# Patient Record
Sex: MALE | Race: WHITE
[De-identification: names, ages, dates, MRNs, and addresses within clinical notes are randomized per-mention and may not be internally consistent; named-entity substitution may affect disease eponyms.]

---

## 2017-02-27 ENCOUNTER — HOSPITAL ENCOUNTER (OUTPATIENT)
Dept: HOSPITAL 62 - SP | Age: 72
End: 2017-02-27
Attending: FAMILY MEDICINE
Payer: MEDICARE

## 2017-02-27 DIAGNOSIS — I63.239: Primary | ICD-10-CM

## 2017-02-27 PROCEDURE — 93880 EXTRACRANIAL BILAT STUDY: CPT

## 2018-10-05 ENCOUNTER — HOSPITAL ENCOUNTER (OUTPATIENT)
Dept: HOSPITAL 62 - RAD | Age: 73
End: 2018-10-05
Attending: FAMILY MEDICINE
Payer: MEDICARE

## 2018-10-05 DIAGNOSIS — R05: Primary | ICD-10-CM

## 2018-10-05 PROCEDURE — 71250 CT THORAX DX C-: CPT

## 2018-10-05 NOTE — RADIOLOGY REPORT (SQ)
EXAM DESCRIPTION:  CT CHEST WITHOUT



COMPLETED DATE/TIME:  10/5/2018 9:07 am



REASON FOR STUDY:  COUGH R05  COUGH



COMPARISON:  None.



TECHNIQUE:  CT scan performed of the chest without intravenous contrast.  Images reviewed with lung, 
soft tissue and bone windows.  Reconstructed coronal and sagittal MPR images reviewed.  All images st
ored on PACS.

All CT scanners at this facility use dose modulation, iterative reconstruction, and/or weight based d
osing when appropriate to reduce radiation dose to as low as reasonably achievable (ALARA).

[facility]: Dose Right  [facility]: CareDose    [facility]: Dose Right    [facility]: Teradose 4D    [facility]: Smart Technologies



RADIATION DOSE:  CT Rad equipment meets quality standard of care and radiation dose reduction techniq
ues were employed. CTDIvol: 3.6 mGy. DLP: 152 mGy-cm. mGy.



LIMITATIONS:  No technical limitations.



FINDINGS:  LUNGS AND PLEURA: No masses, infiltrates, or pneumothorax.  No pleural effusions or pleura
l calcifications.

HILAR AND MEDIASTINAL STRUCTURES: No identified masses or abnormal nodes.  No obvious aneurysm.

HEART AND VASCULAR STRUCTURES: No aneurysm.  No pericardial effusion.

UPPER ABDOMEN: No significant findings.  Limited exam.

THYROID AND OTHER SOFT TISSUES: No masses.  No adenopathy.

BONES: No significant finding.

HARDWARE: None in the chest.

OTHER: No other significant findings.



IMPRESSION:  NO SIGNIFICANT FINDING ON NON-CONTRASTED CHEST CT.



TECHNICAL DOCUMENTATION:  JOB ID:  [id number]

Quality ID # [id number]: Final reports with documentation of one or more dose reduction techniques (e.g., Au
tomated exposure control, adjustment of the mA and/or kV according to patient size, use of iterative 
reconstruction technique)

 2011 [email or web address]- All Rights Reserved



Reading location - IP/workstation name: [email or web address]

## 2018-12-28 ENCOUNTER — HOSPITAL ENCOUNTER (OUTPATIENT)
Dept: HOSPITAL 62 - RAD | Age: 73
End: 2018-12-28
Attending: FAMILY MEDICINE
Payer: MEDICARE

## 2018-12-28 DIAGNOSIS — N18.2: Primary | ICD-10-CM

## 2018-12-28 PROCEDURE — 76770 US EXAM ABDO BACK WALL COMP: CPT

## 2018-12-28 NOTE — RADIOLOGY REPORT (SQ)
EXAM DESCRIPTION:  U/S RETROPERITON (RENAL/AORTA)



COMPLETED DATE/TIME:  12/28/2018 9:08 am



REASON FOR STUDY:  CKD N18.2  CHRONIC KIDNEY DISEASE, STAGE 2 (MILD)



COMPARISON:  None.



TECHNIQUE:  Dynamic and static grayscale images acquired of the kidneys and bladder and recorded on P
ACS. Additional selected color Doppler and spectral images recorded.



LIMITATIONS:  None.



FINDINGS:  RIGHT KIDNEY: Normal size. Normal echogenicity. No solid or suspicious masses. No hydronep
hrosis. No calcifications.

LEFT KIDNEY:  Normal size. Normal echogenicity. No solid or suspicious masses. No hydronephrosis. No 
calcifications.

BLADDER: No masses.

OTHER FINDINGS: No other significant finding.



IMPRESSION:  NORMAL RENAL AND BLADDER ULTRASOUND.



TECHNICAL DOCUMENTATION:  JOB ID:  [id number]

 2011 [employer]- All Rights Reserved



Reading location - IP/workstation name: [email or web address]

## 2020-12-16 ENCOUNTER — HOSPITAL ENCOUNTER (OUTPATIENT)
Dept: HOSPITAL 62 - RAD | Age: 75
End: 2020-12-16
Attending: PHYSICIAN ASSISTANT
Payer: MEDICARE

## 2020-12-16 DIAGNOSIS — R94.4: Primary | ICD-10-CM

## 2020-12-16 PROCEDURE — 76770 US EXAM ABDO BACK WALL COMP: CPT

## 2020-12-16 NOTE — RADIOLOGY REPORT (SQ)
EXAM DESCRIPTION:  U/S RETROPERITON (RENAL/AORTA)



IMAGES COMPLETED DATE/TIME:  12/16/2020 9:35 am



REASON FOR STUDY:  (R94.4)ABNORMAL RESULTS OF KIDNEY FUNCTION STUDIES R94.4  ABNORMAL RESULTS OF KIDN
EY FUNCTION STUDIES



COMPARISON:  Renal ultrasound from 12/28/2018.



TECHNIQUE:  Dynamic and static grayscale images acquired of the kidneys and bladder and recorded on P
ACS. Additional selected color Doppler and spectral images recorded.



LIMITATIONS:  None.



FINDINGS:  RIGHT KIDNEY: The right kidney measures 9.4 cm in length.  The normal corticomedullary dif
ferentiation is preserved.  There is no hydronephrosis, calcification or mass.

LEFT KIDNEY:  The left kidney measures 9.8 cm in length.  The normal corticomedullary differentiation
 is preserved.  There is no hydronephrosis, calcification or mass.

BLADDER: No abnormality.

OTHER FINDINGS: No other findings.



IMPRESSION:  No abnormality of the kidneys and urinary bladder.



TECHNICAL DOCUMENTATION:  JOB ID:  [id number]

 2011 [employer]- All Rights Reserved



Reading location - IP/workstation name: [email or web address]